# Patient Record
Sex: MALE | Race: WHITE | URBAN - METROPOLITAN AREA
[De-identification: names, ages, dates, MRNs, and addresses within clinical notes are randomized per-mention and may not be internally consistent; named-entity substitution may affect disease eponyms.]

---

## 2017-09-09 ENCOUNTER — APPOINTMENT (OUTPATIENT)
Dept: GENERAL RADIOLOGY | Facility: CLINIC | Age: 27
End: 2017-09-09
Attending: FAMILY MEDICINE

## 2017-09-09 ENCOUNTER — HOSPITAL ENCOUNTER (OUTPATIENT)
Facility: CLINIC | Age: 27
Setting detail: OBSERVATION
Discharge: HOME | End: 2017-09-10
Attending: FAMILY MEDICINE | Admitting: INTERNAL MEDICINE

## 2017-09-09 DIAGNOSIS — R11.11 INTRACTABLE VOMITING WITHOUT NAUSEA, UNSPECIFIED VOMITING TYPE: ICD-10-CM

## 2017-09-09 DIAGNOSIS — K29.00 ACUTE GASTRITIS WITHOUT HEMORRHAGE, UNSPECIFIED GASTRITIS TYPE: ICD-10-CM

## 2017-09-09 DIAGNOSIS — K21.9 GASTROESOPHAGEAL REFLUX DISEASE WITHOUT ESOPHAGITIS: Primary | ICD-10-CM

## 2017-09-09 PROBLEM — R11.0 NAUSEA: Status: ACTIVE | Noted: 2017-09-09

## 2017-09-09 PROBLEM — R11.2 NAUSEA AND VOMITING: Status: ACTIVE | Noted: 2017-09-09

## 2017-09-09 LAB
ALBUMIN SERPL-MCNC: 4.4 G/DL (ref 3.4–5)
ALP SERPL-CCNC: 66 U/L (ref 40–150)
ALT SERPL W P-5'-P-CCNC: 44 U/L (ref 0–70)
AMPHETAMINES UR QL: NOT DETECTED NG/ML
ANION GAP SERPL CALCULATED.3IONS-SCNC: 10 MMOL/L (ref 3–14)
AST SERPL W P-5'-P-CCNC: 59 U/L (ref 0–45)
BARBITURATES UR QL SCN: NOT DETECTED NG/ML
BASOPHILS # BLD AUTO: 0 10E9/L (ref 0–0.2)
BASOPHILS NFR BLD AUTO: 0.2 %
BENZODIAZ UR QL SCN: ABNORMAL NG/ML
BILIRUB DIRECT SERPL-MCNC: 0.2 MG/DL (ref 0–0.2)
BILIRUB SERPL-MCNC: 0.8 MG/DL (ref 0.2–1.3)
BUN SERPL-MCNC: 16 MG/DL (ref 7–30)
BUPRENORPHINE UR QL: NOT DETECTED NG/ML
CALCIUM SERPL-MCNC: 9.5 MG/DL (ref 8.5–10.1)
CANNABINOIDS UR QL: NOT DETECTED NG/ML
CHLORIDE SERPL-SCNC: 106 MMOL/L (ref 94–109)
CO2 SERPL-SCNC: 26 MMOL/L (ref 20–32)
COCAINE UR QL SCN: NOT DETECTED NG/ML
CREAT SERPL-MCNC: 0.88 MG/DL (ref 0.66–1.25)
D-METHAMPHET UR QL: NOT DETECTED NG/ML
DEPRECATED S PYO AG THROAT QL EIA: NORMAL
DEPRECATED S PYO AG THROAT QL EIA: NORMAL
DIFFERENTIAL METHOD BLD: ABNORMAL
EOSINOPHIL # BLD AUTO: 0.5 10E9/L (ref 0–0.7)
EOSINOPHIL NFR BLD AUTO: 5.9 %
ERYTHROCYTE [DISTWIDTH] IN BLOOD BY AUTOMATED COUNT: 12.4 % (ref 10–15)
GFR SERPL CREATININE-BSD FRML MDRD: >90 ML/MIN/1.7M2
GLUCOSE SERPL-MCNC: 77 MG/DL (ref 70–99)
HCT VFR BLD AUTO: 50 % (ref 40–53)
HGB BLD-MCNC: 17.2 G/DL (ref 13.3–17.7)
IMM GRANULOCYTES # BLD: 0 10E9/L (ref 0–0.4)
IMM GRANULOCYTES NFR BLD: 0.2 %
LIPASE SERPL-CCNC: 110 U/L (ref 73–393)
LYMPHOCYTES # BLD AUTO: 1.6 10E9/L (ref 0.8–5.3)
LYMPHOCYTES NFR BLD AUTO: 19.1 %
MCH RBC QN AUTO: 28.6 PG (ref 26.5–33)
MCHC RBC AUTO-ENTMCNC: 34.4 G/DL (ref 31.5–36.5)
MCV RBC AUTO: 83 FL (ref 78–100)
METHADONE UR QL SCN: NOT DETECTED NG/ML
MONOCYTES # BLD AUTO: 0.5 10E9/L (ref 0–1.3)
MONOCYTES NFR BLD AUTO: 5.8 %
NEUTROPHILS # BLD AUTO: 5.8 10E9/L (ref 1.6–8.3)
NEUTROPHILS NFR BLD AUTO: 68.8 %
OPIATES UR QL SCN: NOT DETECTED NG/ML
OXYCODONE UR QL SCN: NOT DETECTED NG/ML
PCP UR QL SCN: NOT DETECTED NG/ML
PLATELET # BLD AUTO: 196 10E9/L (ref 150–450)
POTASSIUM SERPL-SCNC: 4.5 MMOL/L (ref 3.4–5.3)
PROPOXYPH UR QL: NOT DETECTED NG/ML
PROT SERPL-MCNC: 8 G/DL (ref 6.8–8.8)
RBC # BLD AUTO: 6.01 10E12/L (ref 4.4–5.9)
SODIUM SERPL-SCNC: 142 MMOL/L (ref 133–144)
SPECIMEN SOURCE: NORMAL
TRICYCLICS UR QL SCN: NOT DETECTED NG/ML
WBC # BLD AUTO: 8.4 10E9/L (ref 4–11)

## 2017-09-09 PROCEDURE — 99285 EMERGENCY DEPT VISIT HI MDM: CPT | Mod: 25

## 2017-09-09 PROCEDURE — 80306 DRUG TEST PRSMV INSTRMNT: CPT | Performed by: FAMILY MEDICINE

## 2017-09-09 PROCEDURE — 74020 XR ABDOMEN 2 VW: CPT | Mod: TC

## 2017-09-09 PROCEDURE — 87081 CULTURE SCREEN ONLY: CPT | Performed by: INTERNAL MEDICINE

## 2017-09-09 PROCEDURE — 96376 TX/PRO/DX INJ SAME DRUG ADON: CPT

## 2017-09-09 PROCEDURE — 96375 TX/PRO/DX INJ NEW DRUG ADDON: CPT

## 2017-09-09 PROCEDURE — 25000125 ZZHC RX 250: Performed by: FAMILY MEDICINE

## 2017-09-09 PROCEDURE — 99207 ZZC MOONLIGHTING INDICATOR: CPT | Performed by: INTERNAL MEDICINE

## 2017-09-09 PROCEDURE — 96361 HYDRATE IV INFUSION ADD-ON: CPT

## 2017-09-09 PROCEDURE — 87880 STREP A ASSAY W/OPTIC: CPT | Performed by: INTERNAL MEDICINE

## 2017-09-09 PROCEDURE — 25000132 ZZH RX MED GY IP 250 OP 250 PS 637: Performed by: INTERNAL MEDICINE

## 2017-09-09 PROCEDURE — 80048 BASIC METABOLIC PNL TOTAL CA: CPT | Performed by: FAMILY MEDICINE

## 2017-09-09 PROCEDURE — G0378 HOSPITAL OBSERVATION PER HR: HCPCS

## 2017-09-09 PROCEDURE — 25000132 ZZH RX MED GY IP 250 OP 250 PS 637: Performed by: FAMILY MEDICINE

## 2017-09-09 PROCEDURE — 96374 THER/PROPH/DIAG INJ IV PUSH: CPT

## 2017-09-09 PROCEDURE — 99285 EMERGENCY DEPT VISIT HI MDM: CPT | Mod: Z6 | Performed by: FAMILY MEDICINE

## 2017-09-09 PROCEDURE — 25000128 H RX IP 250 OP 636: Performed by: INTERNAL MEDICINE

## 2017-09-09 PROCEDURE — 85025 COMPLETE CBC W/AUTO DIFF WBC: CPT | Performed by: FAMILY MEDICINE

## 2017-09-09 PROCEDURE — 83690 ASSAY OF LIPASE: CPT | Performed by: FAMILY MEDICINE

## 2017-09-09 PROCEDURE — 99219 ZZC INITIAL OBSERVATION CARE,LEVL II: CPT | Performed by: INTERNAL MEDICINE

## 2017-09-09 PROCEDURE — 25000128 H RX IP 250 OP 636: Performed by: FAMILY MEDICINE

## 2017-09-09 PROCEDURE — 80076 HEPATIC FUNCTION PANEL: CPT | Performed by: FAMILY MEDICINE

## 2017-09-09 RX ORDER — METOCLOPRAMIDE 5 MG/1
10 TABLET ORAL EVERY 6 HOURS PRN
Status: DISCONTINUED | OUTPATIENT
Start: 2017-09-09 | End: 2017-09-10 | Stop reason: HOSPADM

## 2017-09-09 RX ORDER — METOCLOPRAMIDE HYDROCHLORIDE 5 MG/ML
10 INJECTION INTRAMUSCULAR; INTRAVENOUS ONCE
Status: COMPLETED | OUTPATIENT
Start: 2017-09-09 | End: 2017-09-09

## 2017-09-09 RX ORDER — SODIUM CHLORIDE 9 MG/ML
INJECTION, SOLUTION INTRAVENOUS CONTINUOUS
Status: DISCONTINUED | OUTPATIENT
Start: 2017-09-09 | End: 2017-09-09

## 2017-09-09 RX ORDER — SUCRALFATE ORAL 1 G/10ML
1 SUSPENSION ORAL
Status: DISCONTINUED | OUTPATIENT
Start: 2017-09-09 | End: 2017-09-10 | Stop reason: HOSPADM

## 2017-09-09 RX ORDER — ONDANSETRON 2 MG/ML
4 INJECTION INTRAMUSCULAR; INTRAVENOUS EVERY 6 HOURS PRN
Status: CANCELLED | OUTPATIENT
Start: 2017-09-09

## 2017-09-09 RX ORDER — METOCLOPRAMIDE HYDROCHLORIDE 5 MG/ML
10 INJECTION INTRAMUSCULAR; INTRAVENOUS EVERY 6 HOURS PRN
Status: DISCONTINUED | OUTPATIENT
Start: 2017-09-09 | End: 2017-09-10 | Stop reason: HOSPADM

## 2017-09-09 RX ORDER — LORAZEPAM 2 MG/ML
.5-1 INJECTION INTRAMUSCULAR EVERY 4 HOURS PRN
Status: CANCELLED | OUTPATIENT
Start: 2017-09-09

## 2017-09-09 RX ORDER — NALOXONE HYDROCHLORIDE 0.4 MG/ML
.1-.4 INJECTION, SOLUTION INTRAMUSCULAR; INTRAVENOUS; SUBCUTANEOUS
Status: CANCELLED | OUTPATIENT
Start: 2017-09-09

## 2017-09-09 RX ORDER — PROCHLORPERAZINE 25 MG
25 SUPPOSITORY, RECTAL RECTAL EVERY 12 HOURS PRN
Status: DISCONTINUED | OUTPATIENT
Start: 2017-09-09 | End: 2017-09-10 | Stop reason: HOSPADM

## 2017-09-09 RX ORDER — ONDANSETRON 2 MG/ML
4 INJECTION INTRAMUSCULAR; INTRAVENOUS EVERY 6 HOURS PRN
Status: DISCONTINUED | OUTPATIENT
Start: 2017-09-09 | End: 2017-09-10 | Stop reason: HOSPADM

## 2017-09-09 RX ORDER — LIDOCAINE 40 MG/G
CREAM TOPICAL
Status: DISCONTINUED | OUTPATIENT
Start: 2017-09-09 | End: 2017-09-09

## 2017-09-09 RX ORDER — ONDANSETRON 4 MG/1
4 TABLET, ORALLY DISINTEGRATING ORAL EVERY 6 HOURS PRN
Status: DISCONTINUED | OUTPATIENT
Start: 2017-09-09 | End: 2017-09-10 | Stop reason: HOSPADM

## 2017-09-09 RX ORDER — SODIUM CHLORIDE 9 MG/ML
1000 INJECTION, SOLUTION INTRAVENOUS CONTINUOUS
Status: DISCONTINUED | OUTPATIENT
Start: 2017-09-09 | End: 2017-09-09

## 2017-09-09 RX ORDER — PROCHLORPERAZINE 25 MG
25 SUPPOSITORY, RECTAL RECTAL EVERY 12 HOURS PRN
Status: CANCELLED | OUTPATIENT
Start: 2017-09-09

## 2017-09-09 RX ORDER — ONDANSETRON 4 MG/1
4 TABLET, ORALLY DISINTEGRATING ORAL EVERY 6 HOURS PRN
Status: CANCELLED | OUTPATIENT
Start: 2017-09-09

## 2017-09-09 RX ORDER — SODIUM CHLORIDE 9 MG/ML
INJECTION, SOLUTION INTRAVENOUS CONTINUOUS
Status: DISCONTINUED | OUTPATIENT
Start: 2017-09-09 | End: 2017-09-10 | Stop reason: HOSPADM

## 2017-09-09 RX ORDER — METOCLOPRAMIDE 5 MG/1
10 TABLET ORAL EVERY 6 HOURS PRN
Status: CANCELLED | OUTPATIENT
Start: 2017-09-09

## 2017-09-09 RX ORDER — PROCHLORPERAZINE MALEATE 5 MG
5-10 TABLET ORAL EVERY 6 HOURS PRN
Status: CANCELLED | OUTPATIENT
Start: 2017-09-09

## 2017-09-09 RX ORDER — LORAZEPAM 2 MG/ML
0.5 INJECTION INTRAMUSCULAR ONCE
Status: COMPLETED | OUTPATIENT
Start: 2017-09-09 | End: 2017-09-09

## 2017-09-09 RX ORDER — LORAZEPAM 2 MG/ML
0.5 INJECTION INTRAMUSCULAR ONCE
Status: DISCONTINUED | OUTPATIENT
Start: 2017-09-09 | End: 2017-09-09

## 2017-09-09 RX ORDER — METOCLOPRAMIDE HYDROCHLORIDE 5 MG/ML
10 INJECTION INTRAMUSCULAR; INTRAVENOUS EVERY 6 HOURS PRN
Status: CANCELLED | OUTPATIENT
Start: 2017-09-09

## 2017-09-09 RX ORDER — PROCHLORPERAZINE MALEATE 5 MG
5-10 TABLET ORAL EVERY 6 HOURS PRN
Status: DISCONTINUED | OUTPATIENT
Start: 2017-09-09 | End: 2017-09-10 | Stop reason: HOSPADM

## 2017-09-09 RX ORDER — DIPHENHYDRAMINE HYDROCHLORIDE 50 MG/ML
25 INJECTION INTRAMUSCULAR; INTRAVENOUS ONCE
Status: COMPLETED | OUTPATIENT
Start: 2017-09-09 | End: 2017-09-09

## 2017-09-09 RX ORDER — LORAZEPAM 0.5 MG/1
.5-1 TABLET ORAL EVERY 4 HOURS PRN
Status: CANCELLED | OUTPATIENT
Start: 2017-09-09

## 2017-09-09 RX ORDER — NALOXONE HYDROCHLORIDE 0.4 MG/ML
.1-.4 INJECTION, SOLUTION INTRAMUSCULAR; INTRAVENOUS; SUBCUTANEOUS
Status: DISCONTINUED | OUTPATIENT
Start: 2017-09-09 | End: 2017-09-10 | Stop reason: HOSPADM

## 2017-09-09 RX ADMIN — SODIUM CHLORIDE: 9 INJECTION, SOLUTION INTRAVENOUS at 20:28

## 2017-09-09 RX ADMIN — LIDOCAINE HYDROCHLORIDE 30 ML: 20 SOLUTION ORAL; TOPICAL at 18:23

## 2017-09-09 RX ADMIN — METOCLOPRAMIDE 10 MG: 5 INJECTION, SOLUTION INTRAMUSCULAR; INTRAVENOUS at 17:03

## 2017-09-09 RX ADMIN — SODIUM CHLORIDE 1000 ML: 9 INJECTION, SOLUTION INTRAVENOUS at 16:02

## 2017-09-09 RX ADMIN — SODIUM CHLORIDE 1000 ML: 9 INJECTION, SOLUTION INTRAVENOUS at 17:14

## 2017-09-09 RX ADMIN — METOCLOPRAMIDE 10 MG: 5 INJECTION, SOLUTION INTRAMUSCULAR; INTRAVENOUS at 16:02

## 2017-09-09 RX ADMIN — LORAZEPAM 0.5 MG: 2 INJECTION INTRAMUSCULAR; INTRAVENOUS at 17:31

## 2017-09-09 RX ADMIN — PROCHLORPERAZINE EDISYLATE 10 MG: 5 INJECTION INTRAMUSCULAR; INTRAVENOUS at 21:50

## 2017-09-09 RX ADMIN — PANTOPRAZOLE SODIUM 40 MG: 40 INJECTION, POWDER, FOR SOLUTION INTRAVENOUS at 16:02

## 2017-09-09 RX ADMIN — DIPHENHYDRAMINE HYDROCHLORIDE 25 MG: 50 INJECTION, SOLUTION INTRAMUSCULAR; INTRAVENOUS at 17:03

## 2017-09-09 RX ADMIN — ONDANSETRON 4 MG: 2 SOLUTION INTRAMUSCULAR; INTRAVENOUS at 20:29

## 2017-09-09 NOTE — ED PROVIDER NOTES
History     Chief Complaint   Patient presents with     Nausea & Vomiting     HPI  New Velazquez is a 27 year old male who presents with nausea and vomiting and unable to swallow.  Patient has a history of bad acid reflux and he's had a couple of episodes worries had to be admitted to the hospital because he's been unable to swallow.  Patient states he was doing fine until a couple days ago we started having increasing pain.  A guide to the point today worries been unable to even swallow saliva.  He denies any fevers or chills.  Denies any blood in his vomit or coffee ground like emesis.  Bowel movements normal, no melena or bloody stools.    I have reviewed the Medications, Allergies, Past Medical and Surgical History, and Social History in the Epic system.        Review of Systems   All other systems reviewed and are negative.      Physical Exam   BP: 132/70  Pulse: 94  Temp: 98.8  F (37.1  C)  Resp: 18  Height: 182.9 cm (6')  Weight: 79.4 kg (175 lb)  SpO2: 99 %  Physical Exam   Constitutional: He appears well-developed and well-nourished. No distress.   HENT:   Head: Normocephalic and atraumatic.   Mouth/Throat: Oropharynx is clear and moist.   Eyes: Conjunctivae are normal.   Neck: Normal range of motion.   Cardiovascular: Normal rate, regular rhythm and normal heart sounds.    No murmur heard.  Pulmonary/Chest: Effort normal and breath sounds normal. No respiratory distress. He has no wheezes.   Abdominal: Soft. Bowel sounds are normal. He exhibits no distension. There is tenderness (pigastric). There is no rebound and no guarding.   Skin: Skin is warm and dry.   Nursing note and vitals reviewed.      ED Course     ED Course     Procedures         Results for orders placed or performed during the hospital encounter of 09/09/17   XR Abdomen 2 Views    Narrative    ABDOMEN TWO VIEWS 9/9/2017 4:59 PM     HISTORY: Vomiting.    COMPARISON: None.      Impression    IMPRESSION: Normal.    AYDIN FIGUEROA MD   CBC  with platelets differential   Result Value Ref Range    WBC 8.4 4.0 - 11.0 10e9/L    RBC Count 6.01 (H) 4.4 - 5.9 10e12/L    Hemoglobin 17.2 13.3 - 17.7 g/dL    Hematocrit 50.0 40.0 - 53.0 %    MCV 83 78 - 100 fl    MCH 28.6 26.5 - 33.0 pg    MCHC 34.4 31.5 - 36.5 g/dL    RDW 12.4 10.0 - 15.0 %    Platelet Count 196 150 - 450 10e9/L    Diff Method Automated Method     % Neutrophils 68.8 %    % Lymphocytes 19.1 %    % Monocytes 5.8 %    % Eosinophils 5.9 %    % Basophils 0.2 %    % Immature Granulocytes 0.2 %    Absolute Neutrophil 5.8 1.6 - 8.3 10e9/L    Absolute Lymphocytes 1.6 0.8 - 5.3 10e9/L    Absolute Monocytes 0.5 0.0 - 1.3 10e9/L    Absolute Eosinophils 0.5 0.0 - 0.7 10e9/L    Absolute Basophils 0.0 0.0 - 0.2 10e9/L    Abs Immature Granulocytes 0.0 0 - 0.4 10e9/L   Basic metabolic panel   Result Value Ref Range    Sodium 142 133 - 144 mmol/L    Potassium 4.5 3.4 - 5.3 mmol/L    Chloride 106 94 - 109 mmol/L    Carbon Dioxide 26 20 - 32 mmol/L    Anion Gap 10 3 - 14 mmol/L    Glucose 77 70 - 99 mg/dL    Urea Nitrogen 16 7 - 30 mg/dL    Creatinine 0.88 0.66 - 1.25 mg/dL    GFR Estimate >90 >60 mL/min/1.7m2    GFR Estimate If Black >90 >60 mL/min/1.7m2    Calcium 9.5 8.5 - 10.1 mg/dL   Lipase   Result Value Ref Range    Lipase 110 73 - 393 U/L   Hepatic panel   Result Value Ref Range    Bilirubin Direct 0.2 0.0 - 0.2 mg/dL    Bilirubin Total 0.8 0.2 - 1.3 mg/dL    Albumin 4.4 3.4 - 5.0 g/dL    Protein Total 8.0 6.8 - 8.8 g/dL    Alkaline Phosphatase 66 40 - 150 U/L    ALT 44 0 - 70 U/L    AST 59 (H) 0 - 45 U/L     Medications   lidocaine 1 % 1 mL (not administered)   lidocaine (LMX4) kit (not administered)   sodium chloride (PF) 0.9% PF flush 3 mL (not administered)   sodium chloride (PF) 0.9% PF flush 3 mL (3 mLs Intracatheter Not Given 9/9/17 1729)   0.9% sodium chloride BOLUS (0 mLs Intravenous Stopped 9/9/17 1715)     Followed by   0.9% sodium chloride infusion (1,000 mLs Intravenous New Bag 9/9/17 1714)    LORazepam (ATIVAN) injection 0.5 mg (not administered)   metoclopramide (REGLAN) injection 10 mg (10 mg Intravenous Given 9/9/17 1602)   lidocaine (viscous) (XYLOCAINE) 2 % 15 mL, alum & mag hydroxide-simethicone (MYLANTA ES/MAALOX  ES) 15 mL GI Cocktail (30 mLs Oral Given 9/9/17 1823)   pantoprazole (PROTONIX) 40 mg IV push injection (40 mg Intravenous Given 9/9/17 1602)   metoclopramide (REGLAN) injection 10 mg (10 mg Intravenous Given 9/9/17 1703)   diphenhydrAMINE (BENADRYL) injection 25 mg (25 mg Intravenous Given 9/9/17 1703)   LORazepam (ATIVAN) injection 0.5 mg (0.5 mg Intravenous Given 9/9/17 1731)     labs are reviewed and for the most part were unremarkable except for slight elevation in his AST.  We tried multiple medications here to get his pain and discomfort under control including Reglan, Ativan and a G.I. cocktail.  The Ativan seem to help the best but he still unable to swallow anything.  We tried a G.I. cocktail and he spit that up and started retching immediately with that also.  I did review his hospitalization at in the [facility] last year with a similar presentation and there is some question about a possible cyclic vomiting syndrome.  He did have a scope done then and that was normal.  I think his symptoms do fit more with gastritis though.  Since the patient is unable to swallow, and we've tried multiple treatments down here, will plan on admitting the patient for observation, bowel rest, IV Protonix and time.  Discuss the case with the hospitalist they would like me to add on a urine drug screen.    Assessments & Plan (with Medical Decision Making)  gastritis      I have reviewed the nursing notes.    I have reviewed the findings, diagnosis, plan and need for follow up with the patient.        9/9/2017   [facility] EMERGENCY DEPARTMENT     Kj Joseph MD  09/09/17 9002

## 2017-09-09 NOTE — IP AVS SNAPSHOT
[address] [facility]    [address] [place] [address]    Phone:  [phone]                                       After Visit Summary   9/9/2017    New Velazquez    MRN: [id number]           After Visit Summary Signature Page     I have received my discharge instructions, and my questions have been answered. I have discussed any challenges I see with this plan with the nurse or doctor.    ..........................................................................................................................................  Patient/Patient Representative Signature      ..........................................................................................................................................  Patient Representative Print Name and Relationship to Patient    ..................................................               ................................................  Date                                            Time    ..........................................................................................................................................  Reviewed by Signature/Title    ...................................................              ..............................................  Date                                                            Time

## 2017-09-09 NOTE — ED NOTES
He has a history of GERD and has not been taking his meds. His stomach is so irritated he can't eat or drink or swallow his own saliva.

## 2017-09-09 NOTE — ED NOTES
"Immediately after swallowed the GI Cocktail, most of it came right back up.  He has just been \"spitting\" since.    "

## 2017-09-09 NOTE — IP AVS SNAPSHOT
MRN:[id number]                      After Visit Summary   9/9/2017    New Velazquez    MRN: [id number]           Thank you!     Thank you for choosing [facility] for your care. Our goal is always to provide you with excellent care. Hearing back from our patients is one way we can continue to improve our services. Please take a few minutes to complete the written survey that you may receive in the mail after you visit with us. Thank you!        Patient Information     Date Of Birth          1990        About your hospital stay     You were admitted on:  September 9, 2017 You last received care in the:  [address] [facility]    You were discharged on:  September 10, 2017       Who to Call     For medical emergencies, please call 911.  For non-urgent questions about your medical care, please call your primary care provider or clinic, None          Attending Provider     Provider Specialty    Kj Joseph MD Emergency Medicine    Tiffani Almonte MD [facility]       Primary Care Provider    None      After Care Instructions     Activity       Your activity upon discharge: activity as tolerated            Diet       Follow this diet upon discharge: Regular try to minimize caffeine and chocolate.  Avoid eating close to bedtime                  Follow-up Appointments     Follow-up and recommended labs and tests        Follow up with your primary provider as needed for any recurrent symptoms                  Pending Results     Date and Time Order Name Status Description    9/9/2017 2141 Beta strep group A culture Preliminary             Statement of Approval     Ordered          09/10/17 0547  I have reviewed and agree with all the recommendations and orders detailed in this document.  EFFECTIVE NOW     Approved and electronically signed by:  Jos Scott MD             Admission Information     Date & Time Provider Department Dept. Phone     "2017 Tiffani Almonte MD [address] [facility] [phone]      Your Vitals Were     Blood Pressure Pulse Temperature Respirations Height Weight    103/52 (BP Location: Left arm) 64 97.2  F (36.2  C) (Oral) 16 1.829 m (6') 78.7 kg (173 lb 8 oz)    Pulse Oximetry BMI (Body Mass Index)                97% 23.53 kg/m2          [employer] Information     [employer] lets you send messages to your doctor, view your test results, renew your prescriptions, schedule appointments and more. To sign up, go to [email or web address] . Click on \"Log in\" on the left side of the screen, which will take you to the Welcome page. Then click on \"Sign up Now\" on the right side of the page.     You will be asked to enter the access code listed below, as well as some personal information. Please follow the directions to create your username and password.     Your access code is: [email or web address]  Expires: 2017  8:37 AM     Your access code will  in 90 days. If you need help or a new code, please call your [place] clinic or [phone].        Care EveryWhere ID     This is your Care EveryWhere ID. This could be used by other organizations to access your [facility] [place] medical records  [id number]        Equal Access to Services     ELDA OLIVEIRA AH: Hadii shaan vermao Soroberta, waaxda luqadaha, qaybta kaalmada adetrenayada, kalyan connors . So [facility] [phone].    ATENCIÓN: Si habla español, tiene a ball disposición servicios gratuitos de asistencia lingüística. Lela al [phone].    We comply with applicable federal civil rights laws and [place] laws. We do not discriminate on the basis of race, color, national origin, age, disability sex, sexual orientation or gender identity.               Review of your medicines      START taking        Dose / Directions    sucralfate 1 GM/10ML suspension   Commonly known as:  CARAFATE   Used for:  Gastroesophageal reflux disease without " esophagitis        Dose:  1 g   Take 10 mLs (1 g) by mouth 4 times daily (before meals and nightly)   Quantity:  400 mL   Refills:  0         CONTINUE these medicines which have NOT CHANGED        Dose / Directions    omeprazole 20 MG CR capsule   Commonly known as:  priLOSEC   Used for:  Gastroesophageal reflux disease without esophagitis        Dose:  20 mg   Take 1 capsule (20 mg) by mouth 2 times daily Take 20 mg by mouth daily.   Quantity:  180 capsule   Refills:  3            Where to get your medicines      Some of these will need a paper prescription and others can be bought over the counter. Ask your nurse if you have questions.     Bring a paper prescription for each of these medications     sucralfate 1 GM/10ML suspension                Protect others around you: Learn how to safely use, store and throw away your medicines at www.[employer].org.             Medication List: This is a list of all your medications and when to take them. Check marks below indicate your daily home schedule. Keep this list as a reference.      Medications           Morning Afternoon Evening Bedtime As Needed    omeprazole 20 MG CR capsule   Commonly known as:  priLOSEC   Take 1 capsule (20 mg) by mouth 2 times daily Take 20 mg by mouth daily.   Last time this was given:  20 mg on 9/10/2017  8:09 AM                                      sucralfate 1 GM/10ML suspension   Commonly known as:  CARAFATE   Take 10 mLs (1 g) by mouth 4 times daily (before meals and nightly)   Last time this was given:  1 g on 9/10/2017  8:02 AM                                                      More Information        Sucralfate oral suspension  What is this medicine?  SUCRALFATE (JUSTUS jordy fate) helps to treat ulcers of the intestine.  How should I use this medicine?  Take this medicine by mouth with a glass of water. Follow the directions on the prescription label. Shake well before using. Use a specially marked spoon or container to measure your  medicine. Ask your pharmacist if you do not have one. Household spoons are not accurate. This medicine works best if you take it on an empty stomach, 1 hour before meals. Take your doses at regular intervals. Do not take your medicine more often than directed. Do not stop taking except on your doctor's advice.  Talk to your pediatrician regarding the use of this medicine in children. Special care may be needed.  What side effects may I notice from receiving this medicine?  Side effects that you should report to your doctor or health care professional as soon as possible:    allergic reactions like skin rash, itching or hives, swelling of the face, lips, or tongue    difficulty breathing  Side effects that usually do not require medical attention (report to your doctor or health care professional if they continue or are bothersome):    back pain    constipation    drowsy, dizzy    dry mouth    headache    stomach upset, gas    trouble sleeping  What may interact with this medicine?    antacid    cimetidine    digoxin    ketoconazole    phenytoin    quinidine    ranitidine    some antibiotics like ciprofloxacin, norfloxacin, and ofloxacin    theophylline    thyroid hormones    warfarin  What if I miss a dose?  If you miss a dose, take it as soon as you can. If it is almost time for your next dose, take only that dose. Do not take double or extra doses.  Where should I keep my medicine?  Keep out of the reach of children.  Store at room temperature between 20 and 25 degrees C (68 and 77 degrees F). Keep container tightly closed. Throw away any unused medicine after the expiration date.  What should I tell my health care provider before I take this medicine?  They need to know if you have any of these conditions:    kidney disease    an unusual or allergic reaction to sucralfate, other medicines, foods, dyes, or preservatives    pregnant or trying to get pregnant    breast-feeding  What should I watch for while using  this medicine?  Visit your doctor or health care professional for regular check ups. Let your doctor know if your symptoms do not improve or if you feel worse.  Antacids should not be taken within one half hour before or after this medicine.  NOTE:This sheet is a summary. It may not cover all possible information. If you have questions about this medicine, talk to your doctor, pharmacist, or health care provider. Copyright  2017 [employer]                Omeprazole tablets (OTC)  What is this medicine?  OMEPRAZOLE (oh ME pray zol) prevents the production of acid in the stomach. It is used to treat the symptoms of heartburn. You can buy this medicine without a prescription. This product is not for long-term use, unless otherwise directed by your doctor or health care professional.  How should I use this medicine?  Take this medicine by mouth. Follow the directions on the product label. If you are taking this medicine without a prescription, take one tablet every day. Do not use for longer than 14 days or repeat a course of treatment more often than every 4 months unless directed by a doctor or healthcare professional. Take your dose at regular intervals every 24 hours. Swallow the tablet whole with a drink of water. Do not crush, break or chew. This medicine works best if taken on an empty stomach 30 minutes before breakfast. If you are using this medicine with the prescription of your doctor or healthcare professional, follow the directions you were given. Do not take your medicine more often than directed.  Talk to your pediatrician regarding the use of this medicine in children. Special care may be needed.  What side effects may I notice from receiving this medicine?  Side effects that you should report to your doctor or health care professional as soon as possible:    allergic reactions like skin rash, itching or hives, swelling of the face, lips, or tongue    bone, muscle or joint pain    breathing  problems    chest pain or chest tightness    dark yellow or brown urine    diarrhea    dizziness    fast, irregular heartbeat    feeling faint or lightheaded    fever or sore throat    muscle spasm    palpitations    rash on cheeks or arms that gets worse in the sun    redness, blistering, peeling or loosening of the skin, including inside the mouth    seizures    tremors    unusual bleeding or bruising    unusually weak or tired    yellowing of the eyes or skin  Side effects that usually do not require medical attention (Report these to your doctor or health care professional if they continue or are bothersome.):    constipation    dry mouth    headache    loose stools    nausea  What may interact with this medicine?  Do not take this medicine with any of the following medications:    atazanavir    clopidogrel    nelfinavir  This medicine may also interact with the following medications:    ampicillin    certain medicines for anxiety or sleep    certain medicines that treat or prevent blood clots like warfarin    cyclosporine    diazepam    digoxin    disulfiram    iron salts    methotrexate    mycophenolate mofetil    phenytoin    prescription medicine for fungal or yeast infection like itraconazole, ketoconazole, voriconazole    saquinavir    tacrolimus  What if I miss a dose?  If you miss a dose, take it as soon as you can. If it is almost time for your next dose, take only that dose. Do not take double or extra doses.  Where should I keep my medicine?  Keep out of the reach of children.  Store at room temperature between 20 and 25 degrees C (68 and 77 degrees F). Protect from light and moisture. Throw away any unused medicine after the expiration date.  What should I tell my health care provider before I take this medicine?  They need to know if you have any of these conditions:    black or bloody stools    chest pain    difficulty swallowing    have had heartburn for over 3 months    have heartburn with  dizziness, lightheadedness or sweating    liver disease    lupus    stomach pain    unexplained weight loss    vomiting with blood    wheezing    an unusual or allergic reaction to omeprazole, other medicines, foods, dyes, or preservatives    pregnant or trying to get pregnant    breast-feeding  What should I watch for while using this medicine?  It can take several days before your heartburn gets better. Check with your doctor or health care professional if your condition does not start to get better, or if it gets worse.  Do not treat diarrhea with over the counter products. Contact your doctor if you have diarrhea that lasts more than 2 days or if it is severe and watery.  Do not treat yourself for heartburn with this medicine for more than 14 days in a row. You should only use this medicine for a 2-week treatment period once every 4 months. If your symptoms return shortly after your therapy is complete, or within the 4 month time frame, call your doctor or health care professional.  NOTE:This sheet is a summary. It may not cover all possible information. If you have questions about this medicine, talk to your doctor, pharmacist, or health care provider. Copyright  2017 [employer]                Tips to Control Acid Reflux    To control acid reflux, you ll need to make some basic diet and lifestyle changes. The simple steps outlined below may be all you ll need to ease discomfort.  Watch what you eat    Avoid fatty foods and spicy foods.    Eat fewer acidic foods, such as citrus and tomato-based foods. These can increase symptoms.    Limit drinking alcohol, caffeine, and fizzy beverages. All increase acid reflux.    Try limiting chocolate, peppermint, and spearmint. These can worsen acid reflux in some people.  Watch when you eat    Avoid lying down for 3 hours after eating.    Do not snack before going to bed.  Raise your head  Raising your head and upper body by 4 to 6 inches helps limit reflux when you re  lying down. Put blocks under the head of your bed frame to raise it.  Other changes    Lose weight, if you need to    Don t exercise near bedtime    Avoid tight-fitting clothes    Limit aspirin and ibuprofen    Stop smoking   Date Last Reviewed: 7/1/2016 2000-2017 The [employer]. [address] [place] [address]. All rights reserved. This information is not intended as a substitute for professional medical care. Always follow your healthcare professional's instructions.                GERD (Adult)    The esophagus is a tube that carries food from the mouth to the stomach. A valve at the lower end of the esophagus prevents stomach acid from flowing upward. When this valve doesn't work properly, stomach contents may repeatedly flow back up (reflux) into the esophagus. This is called gastroesophageal reflux disease (GERD). GERD can irritate the esophagus. It can cause problems with swallowing or breathing. In severe cases, GERD can cause recurrent pneumonia or other serious problems.  Symptoms of reflux include burning, pressure or sharp pain in the upper abdomen or mid to lower chest. The pain can spread to the neck, back, or shoulder. There may be belching, an acid taste in the back of the throat, chronic cough, or sore throat or hoarseness. GERD symptoms often occur during the day after a big meal. They can also occur at night when lying down.   Home care  Lifestyle changes can help reduce symptoms. If needed, medicines may be prescribed. Symptoms often improve with treatment, but if treatment is stopped, the symptoms often return after a few months. So most persons with GERD will need to continue treatment.  Lifestyle changes    Limit or avoid fatty, fried, and spicy foods, as well as coffee, chocolate, mint, and foods with high acid content such as tomatoes and citrus fruit and juices (orange, grapefruit, lemon).    Don t eat large meals, especially at night. Frequent, smaller meals are  "best. Do not lie down right after eating. And don t eat anything 3 hours before going to bed.    Avoid drinking alcohol and smoking. As much as possible, stay away from second hand smoke.    If you are overweight, losing weight will reduce symptoms.     Avoid wearing tight clothing around your stomach area.    If your symptoms occur during sleep, use a foam wedge to elevate your upper body (not just your head.) Or, place 4\" blocks under the head of your bed.  Medicines  If needed, medicines can help relieve the symptoms of GERD and prevent damage to the esophagus. Discuss a medicine plan with your healthcare provider. This may include one or more of the following medicines:    Antacids to help neutralize the normal acids in your stomach.    Acid blockers (H2 blockers) to decrease acid production.    Acid inhibitors (PPIs) to decrease acid production in a different way than the blockers. They may work better, but can take a little longer to take effect.  Take an antacid 30-60 minutes after eating and at bedtime, but not at the same time as an acid blocker.  Try not to take medicines such as ibuprofen and aspirin. If you are taking aspirin for your heart or other medical reasons, talk to your healthcare provider about stopping it.  Follow-up care  Follow up with your healthcare provider or as advised by our staff.  When to seek medical advice  Call your healthcare provider if any of the following occur:    Stomach pain gets worse or moves to the lower right abdomen (appendix area)    Chest pain appears or gets worse, or spreads to the back, neck, shoulder, or arm    Frequent vomiting (can t keep down liquids)    Blood in the stool or vomit (red or black in color)    Feeling weak or dizzy    Fever of 100.4 F (38 C) or higher, or as directed by your healthcare provider  Date Last Reviewed: 6/23/2015 2000-2017 [employer]. [address] [place] [address], [place] [address]. All rights reserved. This information " is not intended as a substitute for professional medical care. Always follow your healthcare professional's instructions.

## 2017-09-10 VITALS
HEIGHT: 72 IN | TEMPERATURE: 97.2 F | BODY MASS INDEX: 23.5 KG/M2 | OXYGEN SATURATION: 97 % | RESPIRATION RATE: 16 BRPM | HEART RATE: 64 BPM | DIASTOLIC BLOOD PRESSURE: 52 MMHG | WEIGHT: 173.5 LBS | SYSTOLIC BLOOD PRESSURE: 103 MMHG

## 2017-09-10 LAB — HETEROPH AB SER QL: NEGATIVE

## 2017-09-10 PROCEDURE — 86308 HETEROPHILE ANTIBODY SCREEN: CPT | Performed by: INTERNAL MEDICINE

## 2017-09-10 PROCEDURE — 99217 ZZC OBSERVATION CARE DISCHARGE: CPT | Performed by: INTERNAL MEDICINE

## 2017-09-10 PROCEDURE — G0378 HOSPITAL OBSERVATION PER HR: HCPCS

## 2017-09-10 PROCEDURE — 96361 HYDRATE IV INFUSION ADD-ON: CPT

## 2017-09-10 PROCEDURE — 25000132 ZZH RX MED GY IP 250 OP 250 PS 637: Performed by: INTERNAL MEDICINE

## 2017-09-10 RX ORDER — SUCRALFATE ORAL 1 G/10ML
1 SUSPENSION ORAL
Qty: 400 ML | Refills: 0 | Status: SHIPPED | OUTPATIENT
Start: 2017-09-10

## 2017-09-10 RX ADMIN — OMEPRAZOLE 20 MG: 20 CAPSULE, DELAYED RELEASE ORAL at 08:09

## 2017-09-10 RX ADMIN — SUCRALFATE 1 G: 1 SUSPENSION ORAL at 08:02

## 2017-09-10 NOTE — PROGRESS NOTES
S-(situation): end of shift note    B-(background): nausea    A-(assessment): VSS, afebrile.  Has slept all night, denies nausea and vomiting.  Will be discharging this morning once he has a ride.    R-(recommendations): Continue to monitor until discharge.

## 2017-09-10 NOTE — DISCHARGE SUMMARY
[facility]    Discharge Summary  Hospitalist    Date of Admission:  9/9/2017  Date of Discharge:  9/10/2017  Discharging Provider: Jos Scott MD  Date of Service (when I saw the patient): 09/10/17    Discharge Diagnoses   Active Problems:    Nausea and vomiting    Gastroesophageal reflux disease without esophagitis    Nausea       History of Present Illness   Copied from H&P:   New Velazquez is a 27 year old male who presents with nausea and vomiting.  He has had known reflux symptoms previously.  He was hospitalized 3 years ago and had an EGD done that was normal.  His symptoms started last night with burning in his chest that was worsened if he tried to swallow.  Now if he tries to swallow anything he feels like he is going to throw up and has had some vomiting today. He has not had vomiting in the ED.  He denies fever, chills, sweats or throat pain    Hospital Course   New Velazquez was admitted on 9/9/2017.  The following problems were addressed during his hospitalization:    Active Problems:    Nausea and vomiting    Assessment: no nausea or vomiting while being observed.  Symptoms were all related to GERD    Plan: see below      Gastroesophageal reflux disease without esophagitis    Assessment: given PPI and carafate and had resolution of symptoms    Plan: continue PPI and carafate.  We discussed dietary changes          Jos Scott MD    Significant Results and Procedures   No procedures performed during this admission    Pending Results   These results will be followed up by   Unresulted Labs Ordered in the Past 30 Days of this Admission     Date and Time Order Name Status Description    9/9/2017 2141 Beta strep group A culture In process           Code Status   Full Code       Primary Care Physician   None    Physical Exam   Temp: 97.3  F (36.3  C) Temp src: Oral BP: 106/62 Pulse: 91   Resp: 18 SpO2: 98 % O2 Device: None (Room air)    Vitals:     09/09/17 1519 09/09/17 2017   Weight: 79.4 kg (175 lb) 78.7 kg (173 lb 8 oz)     Vital Signs with Ranges  Temp:  [96.7  F (35.9  C)-98.8  F (37.1  C)] 97.3  F (36.3  C)  Pulse:  [] 91  Resp:  [18-20] 18  BP: ()/(34-81) 106/62  SpO2:  [97 %-100 %] 98 %       Lungs:  CTA throughout  CV:  RRR without murmur  Abdomen: +BS, Soft, NT        Discharge Disposition   Discharged to home  Condition at discharge: Stable    Consultations This Hospital Stay   None    Time Spent on this Encounter   I, Jos Scott MD, personally saw the patient today and spent less than or equal to 30 minutes discharging this patient.    Discharge Orders     Follow-up and recommended labs and tests    Follow up with your primary provider as needed for any recurrent symptoms     Activity   Your activity upon discharge: activity as tolerated     Diet   Follow this diet upon discharge: Regular try to minimize caffeine and chocolate.  Avoid eating close to bedtime       Discharge Medications   Current Discharge Medication List      START taking these medications    Details   sucralfate (CARAFATE) 1 GM/10ML suspension Take 10 mLs (1 g) by mouth 4 times daily (before meals and nightly)  Qty: 400 mL, Refills: 0    Associated Diagnoses: Gastroesophageal reflux disease without esophagitis         CONTINUE these medications which have NOT CHANGED    Details   omeprazole (PRILOSEC) 20 MG capsule Take 1 capsule (20 mg) by mouth 2 times daily Take 20 mg by mouth daily.  Qty: 180 capsule, Refills: 3    Associated Diagnoses: Gastroesophageal reflux disease without esophagitis           Allergies   Allergies   Allergen Reactions     Amoxicillin      Bactrim      Zithromax [Azithromycin]      Data   Most Recent 3 CBC's:  Recent Labs   Lab Test  09/09/17   1555  11/25/10   0415   WBC  8.4  9.0   HGB  17.2  15.3   MCV  83  84   PLT  196  165      Most Recent 3 BMP's:  Recent Labs   Lab Test  09/09/17   1555  11/23/15   1845   NA  142   --     POTASSIUM  4.5   --    CHLORIDE  106   --    CO2  26   --    BUN  16   --    CR  0.88   --    ANIONGAP  10   --    SMITH  9.5   --    GLC  77  81     Most Recent 2 LFT's:  Recent Labs   Lab Test  09/09/17   1555   AST  59*   ALT  44   ALKPHOS  66   BILITOTAL  0.8     Most Recent INR's and Anticoagulation Dosing History:  Anticoagulation Dose History     There is no flowsheet data to display.        Most Recent 3 Troponin's:No lab results found.  Most Recent Cholesterol Panel:  Recent Labs   Lab Test  11/23/15   1845   CHOL  217*   LDL  136*   HDL  60   TRIG  105     Most Recent 6 Bacteria Isolates From Any Culture (See EPIC Reports for Culture Details):No lab results found.  Most Recent TSH, T4 and A1c Labs:No lab results found.

## 2017-09-10 NOTE — H&P
[facility]    History and Physical  Hospitalist       Date of Admission:  9/9/2017  Date of Service (when I saw the patient): 09/09/17    Assessment & Plan       Active Problems:    Nausea and vomiting    Assessment: suspected to be secondary to GERD.  He had burning in his chest prior to vomiting and has had previous GERD with a normal EGD.  He has had ongoing nausea and vomiting during the day today but none since being in the ED. He has not been able to take in clear liquids due to the discomfort.    Plan: register to observation, clear liquids, carafate, protonix, antiemetics, IV fluids.  Will check monospot and rapid strep due to pain with swallowing but suspect these will be normal.      Gastroesophageal reflux disease without esophagitis    Assessment: as above    Plan: as above    DVT Prophylaxis: anticipate less than 24 hour stay  Code Status: Full Code    Disposition: Expected discharge in 1 days once tolerating clear liquids.    Jos Scott MD    Primary Care Physician   None    Chief Complaint   Nausea and vomiting    History is obtained from the patient    History of Present Illness   New Velazquez is a 27 year old male who presents with nausea and vomiting.  He has had known reflux symptoms previously.  He was hospitalized 3 years ago and had an EGD done that was normal.  His symptoms started last night with burning in his chest that was worsened if he tried to swallow.  Now if he tries to swallow anything he feels like he is going to throw up and has had some vomiting today. He has not had vomiting in the ED.  He denies fever, chills, sweats or throat pain.     Past Medical History    I have reviewed this patient's medical history and updated it with pertinent information if needed.   Past Medical History:   Diagnosis Date     GERD (gastroesophageal reflux disease)        Past Surgical History   I have reviewed this patient's surgical history and updated it  with pertinent information if needed.  Past Surgical History:   Procedure Laterality Date     TONSILLECTOMY, ADENOIDECTOMY WITH SHAVER, COMBINED         Prior to Admission Medications   Prior to Admission Medications   Prescriptions Last Dose Informant Patient Reported? Taking?   omeprazole (PRILOSEC) 20 MG capsule 8/30/2017  No No   Sig: Take 1 capsule (20 mg) by mouth 2 times daily Take 20 mg by mouth daily.      Facility-Administered Medications: None     Allergies   Allergies   Allergen Reactions     Amoxicillin      Bactrim      Zithromax [Azithromycin]        Social History   I have reviewed this patient's social history and updated it with pertinent information if needed. New Velazquez  reports that he has never smoked. He has quit using smokeless tobacco. He reports that he does not drink alcohol or use illicit drugs.    Family History   I have reviewed this patient's family history and updated it with pertinent information if needed.   Family History   Problem Relation Age of Onset     Breast Cancer Maternal Grandmother      Breast Cancer Paternal Grandmother      DIABETES Mother      DIABETES Maternal Aunt      Coronary Artery Disease Paternal Grandfather      Hypertension No family hx of      Hyperlipidemia No family hx of      CEREBROVASCULAR DISEASE No family hx of      Colon Cancer No family hx of        Review of Systems   The 10 point Review of Systems is negative other than noted in the HPI or here.     Physical Exam   Temp: 98.8  F (37.1  C) Temp src: Oral BP: 110/81 Pulse: 101   Resp: 20 SpO2: 100 % O2 Device: None (Room air)    Vital Signs with Ranges  Temp:  [98.8  F (37.1  C)] 98.8  F (37.1  C)  Pulse:  [] 101  Resp:  [18-20] 20  BP: (108-132)/(56-81) 110/81  SpO2:  [98 %-100 %] 100 %  175 lbs 0 oz    Constitutional:   awake, alert, cooperative, no apparent distress, and appears stated age     Eyes:   Lids and lashes normal, pupils equal, round and reactive to light, extra ocular  muscles intact, sclera clear, conjunctiva normal     ENT:   Normocephalic, without obvious abnormality, atraumatic, sinuses nontender on palpation, external ears without lesions, oral pharynx with moist mucous membranes, tonsils without erythema or exudates, gums normal and good dentition.     Neck:   Supple, symmetrical, trachea midline, no adenopathy, thyroid symmetric, not enlarged and no tenderness, skin normal     Hematologic / Lymphatic:   no cervical lymphadenopathy and no supraclavicular lymphadenopathy     Back:   Symmetric, no curvature, spinous processes are non-tender on palpation, paraspinous muscles are non-tender on palpation, no costal vertebral tenderness     Lungs:   No increased work of breathing, good air exchange, clear to auscultation bilaterally, no crackles or wheezing     Cardiovascular:   Normal apical impulse, regular rate and rhythm, normal S1 and S2, no S3 or S4, and no murmur noted     Abdomen:   normal bowel sounds, soft, non-distended, non-tender, no masses palpated, no hepatosplenomegally     Neurologic:   Awake, alert, oriented to name, place and time.  Cranial nerves II-XII are grossly intact.  Motor is 5 out of 5 bilaterally.   Sensory is intact. .       Data   Data reviewed today:  I personally reviewed no images or EKG's today.    Recent Labs  Lab 09/09/17  1555   WBC 8.4   HGB 17.2   MCV 83         POTASSIUM 4.5   CHLORIDE 106   CO2 26   BUN 16   CR 0.88   ANIONGAP 10   SMITH 9.5   GLC 77   ALBUMIN 4.4   PROTTOTAL 8.0   BILITOTAL 0.8   ALKPHOS 66   ALT 44   AST 59*   LIPASE 110       Recent Results (from the past 24 hour(s))   XR Abdomen 2 Views    Narrative    ABDOMEN TWO VIEWS 9/9/2017 4:59 PM     HISTORY: Vomiting.    COMPARISON: None.      Impression    IMPRESSION: Normal.    AYDIN FIGUEROA MD

## 2017-09-10 NOTE — PROGRESS NOTES
S-(situation): Patient registered to Observation. Patient arrived to room 256 via wheelchair from ED.     B-(background): Nausea, vomiting and acid reflux.     A-(assessment): Patient is nauseated. Patients skin intact. Alert and oriented x4. Steady gait. Patient denies pain.     R-(recommendations): Orders and observation goals reviewed with patient.     Nursing Observation criteria listed below was met:    Skin issues/needs documented:NA  Isolation needs addressed, if appropriate: NA  Fall Prevention: Education given and documented: NA  Education Assessment documented:Yes  Education Documented (Pre-existing chronic infection such as, MRSA/VRE need education on admission): Yes  New medication patient education completed and documented (Possible Side Effects of Common Medications handout): Yes  Home medications if not able to send immediately home with family stored here: NA  Reminder note placed in discharge instructions: NA  Patient has discharge needs (If yes, please explain): No

## 2017-09-10 NOTE — PROGRESS NOTES
S-(situation): Patient discharged to home via personal vehicle.    B-(background): Observation goals met     A-(assessment): /52 (BP Location: Left arm)  Pulse 64  Temp 97.2  F (36.2  C) (Oral)  Resp 16  Ht 1.829 m (6')  Wt 78.7 kg (173 lb 8 oz)  SpO2 97%  BMI 23.53 kg/m2  Vitals stable, afebrile. Symptoms have resolved. Discharge paperwork has been discussed, no questions or concerns. Pt will make his own follow up visit.     R-(recommendations): Discharge instructions reviewed with pt. Listed belongings gathered and returned to patient.  Patient Education resolved: Yes  New medications-Pt. Has been educated about reason of use and side effects Yes  Home and hospital acquired medications returned to patient NA  Medication Bin checked and emptied on discharge Yes

## 2017-09-11 ENCOUNTER — TELEPHONE (OUTPATIENT)
Dept: FAMILY MEDICINE | Facility: CLINIC | Age: 27
End: 2017-09-11

## 2017-09-11 LAB
BACTERIA SPEC CULT: NORMAL
SPECIMEN SOURCE: NORMAL

## 2017-09-11 NOTE — TELEPHONE ENCOUNTER
Patient discharged from [facility] IP  ( Inpatient or ER).    Discharge location: [facility]   Discharge date: 9/10/17  Diagnosis: Acute gastritis without hemorrhage, unspecified gastritis type, gastroesophageal reflux disease without esophagitis  Patient has been in the ER/IP 0/0 times.  Care Coord:  NA  Please follow up as appropriate. If no follow up required, please close encounter.

## 2017-09-12 NOTE — TELEPHONE ENCOUNTER
Not active patient. No encounters within [facility] within last 18 months.   Closing.   Graciela Elliott RN

## 2018-07-16 ENCOUNTER — OFFICE VISIT (OUTPATIENT)
Dept: FAMILY MEDICINE | Facility: CLINIC | Age: 28
End: 2018-07-16
Payer: COMMERCIAL

## 2018-07-16 VITALS
HEIGHT: 72 IN | WEIGHT: 168.2 LBS | SYSTOLIC BLOOD PRESSURE: 106 MMHG | DIASTOLIC BLOOD PRESSURE: 64 MMHG | BODY MASS INDEX: 22.78 KG/M2 | RESPIRATION RATE: 18 BRPM | HEART RATE: 72 BPM | TEMPERATURE: 99.2 F

## 2018-07-16 DIAGNOSIS — M53.3 SACROILIAC JOINT PAIN: ICD-10-CM

## 2018-07-16 DIAGNOSIS — K21.9 GASTROESOPHAGEAL REFLUX DISEASE WITHOUT ESOPHAGITIS: ICD-10-CM

## 2018-07-16 DIAGNOSIS — Z11.4 SCREENING FOR HUMAN IMMUNODEFICIENCY VIRUS: ICD-10-CM

## 2018-07-16 DIAGNOSIS — R05.9 COUGH: Primary | ICD-10-CM

## 2018-07-16 DIAGNOSIS — M54.30 SCIATIC LEG PAIN: ICD-10-CM

## 2018-07-16 PROCEDURE — 99214 OFFICE O/P EST MOD 30 MIN: CPT | Performed by: NURSE PRACTITIONER

## 2018-07-16 RX ORDER — CYCLOBENZAPRINE HCL 10 MG
10 TABLET ORAL
Qty: 14 TABLET | Refills: 1 | Status: SHIPPED | OUTPATIENT
Start: 2018-07-16

## 2018-07-16 RX ORDER — DOXYCYCLINE 100 MG/1
100 CAPSULE ORAL 2 TIMES DAILY
COMMUNITY
End: 2018-07-16

## 2018-07-16 RX ORDER — METHYLPREDNISOLONE 4 MG
TABLET, DOSE PACK ORAL
Qty: 21 TABLET | Refills: 0 | Status: SHIPPED | OUTPATIENT
Start: 2018-07-16

## 2018-07-16 ASSESSMENT — PAIN SCALES - GENERAL: PAINLEVEL: WORST PAIN (10)

## 2018-07-16 NOTE — MR AVS SNAPSHOT
"              After Visit Summary   7/16/2018    New Velazquez    MRN: [id number]           Patient Information     Date Of Birth          1990        Visit Information        Provider Department      7/16/2018 12:00 PM Tatum Callaway APRN CNP [facility]        Today's Diagnoses     Cough    -  1    Sacroiliac joint pain        Sciatic leg pain        Gastroesophageal reflux disease without esophagitis        Screening for human immunodeficiency virus           Follow-ups after your visit        Future tests that were ordered for you today     Open Future Orders        Priority Expected Expires Ordered    **HIV Antigen Antibody Combo FUTURE anytime Routine 7/16/2018 7/16/2019 7/16/2018            Who to contact     If you have questions or need follow up information about today's clinic visit or your schedule please contact [facility] [place] directly at [phone].  Normal or non-critical lab and imaging results will be communicated to you by MyChart, letter or phone within 4 business days after the clinic has received the results. If you do not hear from us within 7 days, please contact the clinic through MyChart or phone. If you have a critical or abnormal lab result, we will notify you by phone as soon as possible.  Submit refill requests through PicApp or call your pharmacy and they will forward the refill request to us. Please allow 3 business days for your refill to be completed.          Additional Information About Your Visit        MyChart Information     [employer]App lets you send messages to your doctor, view your test results, renew your prescriptions, schedule appointments and more. To sign up, go to [email or web address] . Click on \"Log in\" on the left side of the screen, which will take you to the Welcome page. Then click on \"Sign up Now\" on the right side of the page.     You will be asked to enter the access code listed below, as well as some personal information. Please " "follow the directions to create your username and password.     Your access code is: [email or web address]  Expires: 10/14/2018  6:01 PM     Your access code will  in 90 days. If you need help or a new code, please call your [place] clinic or [phone].        Care EveryWhere ID     This is your Care EveryWhere ID. This could be used by other organizations to access your [place] medical records  [id number]        Your Vitals Were     Pulse Temperature Respirations Height BMI (Body Mass Index)       72 99.2  F (37.3  C) (Temporal) 18 5' 11.85\" (1.825 m) 22.91 kg/m2        Blood Pressure from Last 3 Encounters:   18 106/64   09/10/17 103/52   11/23/15 108/70    Weight from Last 3 Encounters:   18 168 lb 3.2 oz (76.3 kg)   17 173 lb 8 oz (78.7 kg)   11/23/15 164 lb 3 oz (74.5 kg)                 Today's Medication Changes          These changes are accurate as of 18  6:01 PM.  If you have any questions, ask your nurse or doctor.               Start taking these medicines.        Dose/Directions    cyclobenzaprine 10 MG tablet   Commonly known as:  FLEXERIL   Used for:  Sacroiliac joint pain, Sciatic leg pain   Started by:  Tatum Callaway APRN CNP        Dose:  10 mg   Take 1 tablet (10 mg) by mouth nightly as needed for muscle spasms   Quantity:  14 tablet   Refills:  1       methylPREDNISolone 4 MG tablet   Commonly known as:  MEDROL DOSEPAK   Used for:  Sacroiliac joint pain, Sciatic leg pain   Started by:  Tatum Callaway APRN CNP        Follow package instructions   Quantity:  21 tablet   Refills:  0         Stop taking these medicines if you haven't already. Please contact your care team if you have questions.     doxycycline 100 MG capsule   Commonly known as:  VIBRAMYCIN   Stopped by:  Tatum Callaway APRN CNP                Where to get your medicines      These medications were sent to [facility] [id number] [place] [address], [place] - [address]  [address] " [place] [address]    Hours:  test fax sent successfully 1/20/04  kr Phone:  [phone]     cyclobenzaprine 10 MG tablet    methylPREDNISolone 4 MG tablet                Primary Care Provider Fax #    Physician No Ref-Primary [phone]       No address on file        Equal Access to Services     ELDA OLIVEIRA : Hadii shaan ku hadedwigeo Soomaali, waaxda luqadaha, qaybta kaalmada adeegyada, waxay dejanin brandtnatalie smith gabbyjennifer connors . So [facility] [phone].    ATENCIÓN: Si habla español, tiene a ball disposición servicios gratuitos de asistencia lingüística. Lela al [phone].    We comply with applicable federal civil rights laws and [place] laws. We do not discriminate on the basis of race, color, national origin, age, disability, sex, sexual orientation, or gender identity.            Thank you!     Thank you for choosing [facility]  for your care. Our goal is always to provide you with excellent care. Hearing back from our patients is one way we can continue to improve our services. Please take a few minutes to complete the written survey that you may receive in the mail after your visit with us. Thank you!             Your Updated Medication List - Protect others around you: Learn how to safely use, store and throw away your medicines at www.disposemymeds.org.          This list is accurate as of 7/16/18  6:01 PM.  Always use your most recent med list.                   Brand Name Dispense Instructions for use Diagnosis    cyclobenzaprine 10 MG tablet    FLEXERIL    14 tablet    Take 1 tablet (10 mg) by mouth nightly as needed for muscle spasms    Sacroiliac joint pain, Sciatic leg pain       methylPREDNISolone 4 MG tablet    MEDROL DOSEPAK    21 tablet    Follow package instructions    Sacroiliac joint pain, Sciatic leg pain       omeprazole 20 MG CR capsule    priLOSEC    180 capsule    Take 1 capsule (20 mg) by mouth 2 times daily Take 20 mg by mouth daily.    Gastroesophageal reflux disease  without esophagitis       sucralfate 1 GM/10ML suspension    CARAFATE    400 mL    Take 10 mLs (1 g) by mouth 4 times daily (before meals and nightly)    Gastroesophageal reflux disease without esophagitis

## 2018-07-16 NOTE — PROGRESS NOTES
"  SUBJECTIVE:   New Velazquez is a 28 year old male who presents to clinic today for the following health issues:    HPI  Acute Illness   Acute illness concerns: cough  Onset: Beginning of June    Fever: no     Chills/Sweats: no     Headache (location?): no     Sinus Pressure:no    Conjunctivitis:  no    Ear Pain: no    Rhinorrhea: no     Congestion: no     Sore Throat: no      Cough: YES    Wheeze: no     Decreased Appetite: YES    Nausea: no     Vomiting: no     Diarrhea:  no     Dysuria/Freq.: no     Fatigue/Achiness: no     Sick/Strep Exposure: no      Therapies Tried and outcome: Doxycycline 100mg from appointment at [facility] on June 29    Back Pain       Duration: 3 weeks        Specific cause: Coughing    Description:   Location of pain: low back left  Character of pain: sharp  Pain radiation:radiates into the left leg and radiates into the left foot  New numbness or weakness in legs, not attributed to pain:  no     Intensity: At its worst 13/10    History:   Pain interferes with job: YES,   History of back problems: no prior back problems  Any previous MRI or X-rays: None  Sees a specialist for back pain:  No  Therapies tried without relief: acetaminophen (Tylenol) and NSAIDs    Alleviating factors:   Improved by: \"painatrate\" helps for a short amount of time      Precipitating factors:  Worsened by: Lifting, Sitting, Lying Flat and Coughing    Functional and Psychosocial Screen (Jeff STarT Back):      Most recent score:    JEFF START BACK TOTAL SCORE 7/16/2018   Total Score (all 9) 4           Accompanying Signs & Symptoms:  Risk of Fracture:  None  Risk of Cauda Equina:  None  Risk of Infection:  None  Risk of Cancer:  None  Risk of Ankylosing Spondylitis:  Onset at age <35, male, AND morning back stiffness. no       Problem list and histories reviewed & adjusted, as indicated.  Additional history: as documented    He notes that he was seen at [facility] on 6/29/18 for a cough that had lasted 3 weeks. " He was prescribed doxycycline. He has taken it as directed and completed the course. He notes that the doxycycline improved his cough, but it has not fully resolved. He states that he was coughing so hard he became nauseous and felt as if he was going to vomit, but did not end up vomiting. He notes that he is not sleeping well and is unable to bend over due to the pain. He states that his coughing was so strenuous that is exacerbated his back pain with reported sciatica. He has a history of back pain and this was the first flare up in 4-5 years. At his last flare up he did see physical therapy, but he states that  it seemed to make his pain worse. He still has stretches and exercises from physical therapy at home. He notes that he has not tried applying ice or heat, but as applied a topical pain relief cream.     Currently he works as an . He does lift, but not heavy items. He declined a lifting restriction letter for work today.    He notes that he uses tobacco, but rarely. He states that he is able to quit and agreed to cessation plan.    Patient Active Problem List   Diagnosis     Gastroesophageal reflux disease without esophagitis     Nausea and vomiting     Nausea     Past Surgical History:   Procedure Laterality Date     TONSILLECTOMY, ADENOIDECTOMY WITH SHAVER, COMBINED         Social History   Substance Use Topics     Smoking status: Never Smoker     Smokeless tobacco: Former User     Alcohol use No     Family History   Problem Relation Age of Onset     Diabetes Mother      Breast Cancer Maternal Grandmother      Breast Cancer Paternal Grandmother      Diabetes Maternal Aunt      Coronary Artery Disease Paternal Grandfather      Hypertension No family hx of      Hyperlipidemia No family hx of      Cerebrovascular Disease No family hx of      Colon Cancer No family hx of          Current Outpatient Prescriptions   Medication Sig Dispense Refill     cyclobenzaprine (FLEXERIL) 10 MG tablet Take 1  "tablet (10 mg) by mouth nightly as needed for muscle spasms 14 tablet 1     methylPREDNISolone (MEDROL DOSEPAK) 4 MG tablet Follow package instructions 21 tablet 0     omeprazole (PRILOSEC) 20 MG capsule Take 1 capsule (20 mg) by mouth 2 times daily Take 20 mg by mouth daily. 180 capsule 3     sucralfate (CARAFATE) 1 GM/10ML suspension Take 10 mLs (1 g) by mouth 4 times daily (before meals and nightly) 400 mL 0     Allergies   Allergen Reactions     Amoxicillin      Bactrim      Zithromax [Azithromycin]      Recent Labs   Lab Test  09/09/17   1555  11/23/15   1845   LDL   --   136*   HDL   --   60   TRIG   --   105   ALT  44   --    CR  0.88   --    GFRESTIMATED  >90   --    GFRESTBLACK  >90   --    POTASSIUM  4.5   --       BP Readings from Last 3 Encounters:   07/16/18 106/64   09/10/17 103/52   11/23/15 108/70    Wt Readings from Last 3 Encounters:   07/16/18 168 lb 3.2 oz (76.3 kg)   09/09/17 173 lb 8 oz (78.7 kg)   11/23/15 164 lb 3 oz (74.5 kg)        ROS:  Constitutional, HEENT, cardiovascular, pulmonary, GI, , musculoskeletal, neuro, skin, endocrine and psych systems are negative, except as otherwise noted.    This document serves as a record of the services and decisions personally performed and made by Tatum Callaway CNP. It was created on his/her behalf by Marbella Reddy, trained medical scribe. The creation of this document is based the provider's statements to the medical scribes.    Easton Reddy 12:32 PM, July 16, 2018  OBJECTIVE:     /64  Pulse 72  Temp 99.2  F (37.3  C) (Temporal)  Resp 18  Ht 5' 11.85\" (1.825 m)  Wt 168 lb 3.2 oz (76.3 kg)  BMI 22.91 kg/m2  Body mass index is 22.91 kg/(m^2).     GENERAL APPEARANCE: healthy, alert and no distress  RESP: lungs clear to auscultation - no rales, rhonchi or wheezes  CV: regular rates and rhythm, normal S1 S2, no S3 or S4 and no murmur, click or rub  MS: extremities normal- no gross deformities noted and ROM is fair, decreased flexion " of lumbar region mild-mod. No vertebral joint tenderness, left SI joint tenderness, strength 5/5 of LE with push/pull  SKIN: no suspicious lesions or rashes  Neuro: no gross deficits, normal sensation of LE, pattellar reflexes: normal  Straight leg raise: +/- positive on left    Diagnostic Test Results:  No results found for this or any previous visit (from the past 24 hour(s)).    ASSESSMENT/PLAN:       ICD-10-CM    1. Cough R05    2. Sacroiliac joint pain M53.3 cyclobenzaprine (FLEXERIL) 10 MG tablet     methylPREDNISolone (MEDROL DOSEPAK) 4 MG tablet   3. Sciatic leg pain M54.30 cyclobenzaprine (FLEXERIL) 10 MG tablet     methylPREDNISolone (MEDROL DOSEPAK) 4 MG tablet   4. Gastroesophageal reflux disease without esophagitis K21.9    5. Screening for human immunodeficiency virus Z11.4 **HIV Antigen Antibody Combo FUTURE anytime      Reviewed symptoms and etiology patient presents with today. Advised starting flexeril 10 mg nightly prn for muscle spasms and methylprednisolone 4 mg prn for his sacroiliac joint and sciatic leg pain; Rx provided. Discussed directions, benefits, and side effects of medication with patient. Advised taking his Flexeril at night due to drowsiness as a side effect and taking OTC Tylenol during the day for pain relief. Advised stretching exercises for his sacroiliac joint and sciatic leg pain. Patient states that he has some from his previous physical therapy visits that he will use at home. Patient declined lifting restrictions at work.    He has finished his course of Doxycyline and his cough has resolved. Advised watchful monitoring and if new symptoms appear to follow up with PCP.    Patient agreed to tobacco use cessation. Will contact PCP if has any concerns or difficulty with cessation.    Follow up with PCP if symptoms do not improve or worsen or PRN.      The information in this document, created by the medical scribe for me, accurately reflects the services I personally performed  and the decisions made by me. I have reviewed and approved this document for accuracy prior to leaving the patient care area.  Tatum Callaway CNP  12:32 PM, 07/16/18      LAYLA Montgomery CNP  [facility]

## 2019-07-24 ENCOUNTER — TELEPHONE (OUTPATIENT)
Dept: FAMILY MEDICINE | Facility: CLINIC | Age: 29
End: 2019-07-24

## 2019-07-24 NOTE — TELEPHONE ENCOUNTER
Panel Management Review      Patient has the following on his problem list: None      Composite cancer screening  Chart review shows that this patient is due/due soon for the following None  Summary:    Patient is due/failing the following:   PHYSICAL    Action needed:   Patient needs office visit for Physical.    Type of outreach:    Phone, left message for patient to call back.     Questions for provider review:    None                                                                                                                                    Mary Carter CMA (AAMA)       Chart routed to Care Team .

## 2019-07-24 NOTE — LETTER
[facility]  [id number] [place] [address], [address]  [place] [address]  Phone: [phone]  Fax: [phone]  July 31, 2019      New [place]  [address] [place] [id number]      Dear New,    We care about your health and have reviewed your health plan including your medical conditions, medications, and lab results.  Based on this review, it is recommended that you follow up regarding the following health topic(s):  -Wellness (Physical) Visit     We recommend you take the following action(s):  -schedule a WELLNESS (Physical) APPOINTMENT.  We will perform the following labs: None.     Please call us at the [facility] - [phone] (or use [employer]) to address the above recommendations.     Thank you for trusting [facility] and we appreciate the opportunity to serve you.  We look forward to supporting your healthcare needs in the future.    Healthy Regards,    Your Health Care Team  [facility]

## 2019-07-31 NOTE — TELEPHONE ENCOUNTER
LM for patient to return phone call to clinic about message below.  Letter sent.  Mary Carter CMA ([facility])

## 2024-05-07 ENCOUNTER — APPOINTMENT (OUTPATIENT)
Dept: URBAN - METROPOLITAN AREA CLINIC 252 | Age: 34
Setting detail: DERMATOLOGY
End: 2024-05-07

## 2024-05-07 VITALS — WEIGHT: 185 LBS | HEIGHT: 72 IN

## 2024-05-07 DIAGNOSIS — L21.8 OTHER SEBORRHEIC DERMATITIS: ICD-10-CM

## 2024-05-07 PROCEDURE — OTHER COUNSELING: OTHER

## 2024-05-07 PROCEDURE — OTHER PRESCRIPTION: OTHER

## 2024-05-07 PROCEDURE — 99204 OFFICE O/P NEW MOD 45 MIN: CPT

## 2024-05-07 RX ORDER — KETOCONAZOLE 20 MG/G
CREAM TOPICAL ONCE DAILY
Qty: 60 | Refills: 11 | Status: ERX | COMMUNITY
Start: 2024-05-07

## 2024-05-07 RX ORDER — HYDROCORTISONE 25 MG/ML
LOTION TOPICAL
Qty: 118 | Refills: 1 | Status: ERX | COMMUNITY
Start: 2024-05-07

## 2024-05-07 ASSESSMENT — LOCATION SIMPLE DESCRIPTION DERM
LOCATION SIMPLE: LEFT CHEEK
LOCATION SIMPLE: RIGHT CHEEK

## 2024-05-07 ASSESSMENT — LOCATION ZONE DERM: LOCATION ZONE: FACE

## 2024-05-07 ASSESSMENT — LOCATION DETAILED DESCRIPTION DERM
LOCATION DETAILED: RIGHT MEDIAL MALAR CHEEK
LOCATION DETAILED: LEFT MEDIAL MALAR CHEEK

## 2024-05-07 NOTE — HPI: RASH
Is This A New Presentation, Or A Follow-Up?: Rash
Additional History: If he doesnt use lotion daily it gets dry and itchy. Gets red when touched.  Sometimes bleeds from scratching, has tried facial cleanser, lotions, but no prescription, . Happens in beard areas as well. Get worse if he doenst shave. The patient’s problem is exacerbating and not adequately controlled.

## 2024-05-07 NOTE — PROCEDURE: COUNSELING
Patient Specific Counseling (Will Not Stick From Patient To Patient): Plan to prescribe Zoryve foam if regimen prescribed today fails after two weeks.
Detail Level: Zone